# Patient Record
(demographics unavailable — no encounter records)

---

## 2025-03-14 NOTE — HISTORY OF PRESENT ILLNESS
[PGHxTotal] : 3 [[facility] [PGHxPremature] : 0 [PGHxAbortions] : 1 [[facility] [PGHxABInduced] : 0 [PGHxABSpont] : 1 [PGHxEctopic] : 0 [PGHxMultBirths] : 0 [FreeTextEntry1] : 37-year-old -0-1-2 last menstrual cycle was 2025 till now presents with secondary infertility and menorrhagia.  PCOS has not been ruled out.  Pelvic sonogram is normal.  Patient recently had Pap smear at [facility] on 2025 and will obtain the results.  Blood work shows elevated glucose level and elevated hemoglobin A1c.

## 2025-03-14 NOTE — COUNSELING
[Nutrition/ Exercise/ Weight Management] : nutrition, exercise, weight management [Body Image] : body image [Vitamins/Supplements] : vitamins/supplements [Sunscreen] : sunscreen [Breast Self Exam] : breast self exam [Drugs] : drugs [Bladder Hygiene] : bladder hygiene [Confidentiality] : confidentiality

## 2025-03-14 NOTE — DISCUSSION/SUMMARY
[FreeTextEntry1] : 37-year-old -0-1-2 with history of secondary infertility and menorrhagia.  PCOS suspected.  Patient to return in 4 weeks to 6 weeks for endometrial sampling hysteroscopy.  If all is well then she will be started on metformin and Clomid.  All questions answered.

## 2025-03-14 NOTE — HISTORY OF PRESENT ILLNESS
[PGHxTotal] : 3 [[facility] : [facility] [PGHxPremature] : 0 [PGHxAbortions] : 1 [[facility] : 2 [PGHxABInduced] : 0 [PGHxABSpont] : 1 [PGHxEctopic] : 0 [PGHxMultBirths] : 0 [FreeTextEntry1] : 37-year-old -0-1-2 last menstrual cycle was 2025 till now presents with secondary infertility and menorrhagia.  PCOS has not been ruled out.  Pelvic sonogram is normal.  Patient recently had Pap smear at [facility] on 2025 and will obtain the results.  Blood work shows elevated glucose level and elevated hemoglobin A1c.

## 2025-04-18 NOTE — DISCUSSION/SUMMARY
[Obstructive Sleep Apnea] : obstructive sleep apnea [Nasal Polyps] : nasal polyps [Deviated Septum] : deviated septum [Turbinate Hypertrophy] : turbinate hypertrophy [Adenoid Hypertrophy] : adenoid hypertrophy [Sleep Study] : sleep study [None] : There are no changes in medication management [Alcohol Avoidance] : alcohol avoidance [Sedative Avoidance] : sedative avoidance [Weight Loss Program] : weight loss program [de-identified] : HST [FreeTextEntry1] : The pathophysiology of sleep was explained to the patient in detail. Inclusive of this was the reasoning behind and the expected response to positive airway pressure therapy. Compliance was outlined including further follow-up.

## 2025-04-18 NOTE — CONSULT LETTER
[Dear  ___] : Dear  [unfilled], [Consult Letter:] : I had the pleasure of evaluating your patient, [unfilled]. [Please see my note below.] : Please see my note below. [Consult Closing:] : Thank you very much for allowing me to participate in the care of this patient.  If you have any questions, please do not hesitate to contact me. [Sincerely,] : Sincerely, [FreeTextEntry3] : Js Lewis MD FCCP Pulmonary/Critical Care/Sleep Medicine Department of Internal Medicine  [facility] Strong peripheral pulses

## 2025-04-18 NOTE — HISTORY OF PRESENT ILLNESS
[Awakes Unrefreshed] : awakes unrefreshed [Awakes with Dry Mouth] : awakes with dry mouth [Daytime Somnolence] : daytime somnolence [Snoring] : snoring [Awakes with Headache] : does not awaken with headache [Witnessed Apneas] : no witnessed apneas [TextBox_77] : 2100 [TextBox_79] : 9551 [TextBox_81] : 10 [TextBox_83] : 60min [TextBox_93] : restless sleeper [TextBox_165] : mouth breather [ESS] : 8

## 2025-04-18 NOTE — END OF VISIT
[FreeTextEntry3] : chart reviewed [Time Spent: ___ minutes] : I have spent [unfilled] minutes of time on the encounter which excludes teaching and separately reported services.

## 2025-05-16 NOTE — PROCEDURE
[Hysteroscopy] : Hysteroscopy [Time out performed] : Pre-procedure time out performed.  Patient's name, date of birth and procedure confirmed. [Consent Obtained] : Consent obtained [Abnormal uterine bleeding] : abnormal uterine bleeding [Risks] : risks [Benefits] : benefits [Alternatives] : alternatives [Patient] : patient [Infection] : infection [Bleeding] : bleeding [Allergic Reaction] : allergic reaction [Lidocaine___ mL] : [unfilled] ~UmL of lidocaine [flexible] : Using aseptic technique a hysteroscopy was performed using a flexible hysteroscope [Sent to Pathology] : specimen was placed in buffered formalin and sent for pathology [Hemostasis obtained] : hemostasis obtained [Tolerated Well] : Patient tolerated the procedure well [Aftercare instructions/regstrictions given and follow-up scheduled] : Aftercare instructions/restrictions given and follow-up scheduled [de-identified] : Under sterile condition and with paracervical block the cervix was dilated and endometrial sampling obtained and sent to pathology.  Hysteroscopic evaluation shows normal endometrial contour, no myomas or polyps noted.

## 2025-06-13 NOTE — DISCUSSION/SUMMARY
[FreeTextEntry1] : 37-year-old -0-1-2 with PCOS and irregular cycle.  Endometrial sampling shows proliferative endometrium.  Hysterosalpingogram is normal.  Her sexual partner has 1 child on his own and they have never had any children together.  Patient is being started on metformin 500 mg twice daily and Clomid 100 mg days 5-11.  Risks and benefits discussed.  Return in 4 weeks for follow-up.  All questions answered.

## 2025-06-13 NOTE — HISTORY OF PRESENT ILLNESS
[FreeTextEntry1] : 37-year-old -0-1-2 last menstrual cycle was May 11, 2025, status post endometrial sampling for irregular cycle and pathology shows proliferative endometrium.  Partner has 1 child and they never had any pregnancy together.  Hysterosalpingogram is normal.  Patient appears somewhat PCOS posture.